# Patient Record
Sex: FEMALE | Race: WHITE | Employment: FULL TIME | URBAN - METROPOLITAN AREA
[De-identification: names, ages, dates, MRNs, and addresses within clinical notes are randomized per-mention and may not be internally consistent; named-entity substitution may affect disease eponyms.]

---

## 2021-02-19 ENCOUNTER — HOSPITAL ENCOUNTER (OUTPATIENT)
Dept: PREADMISSION TESTING | Age: 41
Discharge: HOME | End: 2021-02-19
Payer: COMMERCIAL

## 2021-02-19 LAB
ALBUMIN SERPL-MCNC: 4 G/DL (ref 3.4–5)
ALBUMIN/GLOB SERPL: 1.2 {RATIO} (ref 0.8–1.7)
ALP SERPL-CCNC: 136 U/L (ref 45–117)
ALT SERPL-CCNC: 69 U/L (ref 13–56)
ANION GAP SERPL CALC-SCNC: 7 MMOL/L (ref 3–18)
APPEARANCE UR: CLEAR
AST SERPL-CCNC: 23 U/L (ref 10–38)
BILIRUB SERPL-MCNC: 0.2 MG/DL (ref 0.2–1)
BILIRUB UR QL: NEGATIVE
BUN SERPL-MCNC: 7 MG/DL (ref 7–18)
BUN/CREAT SERPL: 8 (ref 12–20)
CALCIUM SERPL-MCNC: 8.7 MG/DL (ref 8.5–10.1)
CHLORIDE SERPL-SCNC: 102 MMOL/L (ref 100–111)
CO2 SERPL-SCNC: 29 MMOL/L (ref 21–32)
COLOR UR: YELLOW
CREAT SERPL-MCNC: 0.89 MG/DL (ref 0.6–1.3)
ERYTHROCYTE [DISTWIDTH] IN BLOOD BY AUTOMATED COUNT: 12.7 % (ref 11.6–14.5)
GLOBULIN SER CALC-MCNC: 3.3 G/DL (ref 2–4)
GLUCOSE SERPL-MCNC: 74 MG/DL (ref 74–99)
GLUCOSE UR STRIP.AUTO-MCNC: NEGATIVE MG/DL
HCT VFR BLD AUTO: 39.3 % (ref 35–45)
HGB BLD-MCNC: 13.3 G/DL (ref 12–16)
HGB UR QL STRIP: NEGATIVE
KETONES UR QL STRIP.AUTO: NEGATIVE MG/DL
LEUKOCYTE ESTERASE UR QL STRIP.AUTO: NEGATIVE
MCH RBC QN AUTO: 30.4 PG (ref 24–34)
MCHC RBC AUTO-ENTMCNC: 33.8 G/DL (ref 31–37)
MCV RBC AUTO: 89.7 FL (ref 74–97)
NITRITE UR QL STRIP.AUTO: NEGATIVE
PH UR STRIP: 6 [PH] (ref 5–8)
PLATELET # BLD AUTO: 304 K/UL (ref 135–420)
PMV BLD AUTO: 9.8 FL (ref 9.2–11.8)
POTASSIUM SERPL-SCNC: 3.7 MMOL/L (ref 3.5–5.5)
PROT SERPL-MCNC: 7.3 G/DL (ref 6.4–8.2)
PROT UR STRIP-MCNC: NEGATIVE MG/DL
RBC # BLD AUTO: 4.38 M/UL (ref 4.2–5.3)
SODIUM SERPL-SCNC: 138 MMOL/L (ref 136–145)
SP GR UR REFRACTOMETRY: 1.01 (ref 1–1.03)
UROBILINOGEN UR QL STRIP.AUTO: 0.2 EU/DL (ref 0.2–1)
WBC # BLD AUTO: 11.3 K/UL (ref 4.6–13.2)

## 2021-02-19 PROCEDURE — 81003 URINALYSIS AUTO W/O SCOPE: CPT

## 2021-02-19 PROCEDURE — 85027 COMPLETE CBC AUTOMATED: CPT

## 2021-02-19 PROCEDURE — 80053 COMPREHEN METABOLIC PANEL: CPT

## 2021-02-19 PROCEDURE — 36415 COLL VENOUS BLD VENIPUNCTURE: CPT

## 2021-03-08 ENCOUNTER — HOSPITAL ENCOUNTER (OUTPATIENT)
Dept: PREADMISSION TESTING | Age: 41
Discharge: HOME | End: 2021-03-08
Payer: COMMERCIAL

## 2021-03-08 PROCEDURE — U0003 INFECTIOUS AGENT DETECTION BY NUCLEIC ACID (DNA OR RNA); SEVERE ACUTE RESPIRATORY SYNDROME CORONAVIRUS 2 (SARS-COV-2) (CORONAVIRUS DISEASE [COVID-19]), AMPLIFIED PROBE TECHNIQUE, MAKING USE OF HIGH THROUGHPUT TECHNOLOGIES AS DESCRIBED BY CMS-2020-01-R: HCPCS

## 2021-03-09 LAB — SARS-COV-2, COV2NT: NOT DETECTED

## 2021-03-10 NOTE — H&P
Patient Name:  Sandra Aldana   Account #:  [de-identified]  YOB: 1980      Chief Complaint:  Bilateral upper extremity complaints. History of Chief Complaint:  Ms. Marie Chavez comes in today for evaluation of upper extremity complaints. EMG noted bilateral carpal tunnel symptoms and mild ulnar symptoms on the right. MRI noted possible radicular pathology. Today she comes in with continued symptoms in the upper extremities as noted previously. Past Medical/Surgical History:    Disease/Disorder Date Side Surgery Date Side Comment   Anxiety         Depression         High cholesterol         Sleep apnea            LASIK        Allergies:    Ingredient Reaction Medication Name Comment   PENICILLINS          Current Medications:    Medication Directions   dextroamphetamine-amphetamine 20 mg tablet    fluoxetine 40 mg capsule    lamotrigine 100 mg tablet    Mobic 15 mg tablet take 1 tablet by oral route  every day   montelukast 10 mg tablet    nortriptyline 25 mg capsule    omeprazole 20 mg capsule,delayed release    venlafaxine  mg capsule,extended release 24 hr    venlafaxine ER 75 mg capsule,extended release 24 hr    Women's Daily Formula      Social History:    SMOKING  Status Tobacco Type Units Per Day Yrs Used   Unknown if ever smoked        ALCOHOL  There is a history of alcohol use, but no current usage. Type: Beer and liquor. 6 pk of beer consumed weekly. Family History:    Disease Detail Family Member Age Cause of Death Comments   Arthritis Mother  N      Review of Systems:    GENERAL:  Patient has no signs of weight change. , fever or chills  HEAD/ENTM:  Patient has no signs of headaches, dizziness, hearing loss, ringing in ears, sore throat/hoarseness, recent cold, double vision, blurred vision, itchy eyes, eye redness or eye discharge. CARDIOVASCULAR:  Patient has no signs of chest pain, palpitations, rheumatic fever or heart murmur.   RESPIRATORY:  Patient has no signs of chronic cough, wheezing, difficulty breathing, pain on breathing or shortness of breath. GASTROINTESTINAL:  Patient has no signs of nausea/vomiting, difficulty swallowing, gas/bloating, indigestion, abdominal pain, diarrhea, bloody stools or hemorrhoids. GENITOURINARY:  Patient has no signs of blood in urine, painful urinating, burning sensation, bladder/kidney infection, frequent urinating or incontinence. MUSCULOSKELETAL:  Patient has no signs of swelling in feet. , fracture/dislocation, sprain/strain, tendonitis, joint stiffness, joint pain, rheumatoid disease or gout  INTEGUMENTARY:  Patient has no signs of rash/itching, psoriasis, Raynaud's phenomenon or varicose veins. EMOTIONAL:  Patient has no signs of anxiety, depression, bipolar disorder, memory loss or change in mood. Vitals:  Date BP Pulse Temp (F) Resp. (per min.) Height (Total in.) Weight (lbs.) BMI   01/13/2021     70.00  34.44     Physical Examination:    Psychiatric/General:  No acute distress; pleasant and accommodating. HEENT:  Moist mucous membranes intact. Lymphatic:  Neck is supple with no lymphadenopathy upon evaluation. Respiratory:   Equal bilateral chest wall movement with inspiration; no wheezes or stridor audible. Cardiovascular:    Regular rate and rhythm, as noted by upper extremity pulses. Musculoskeletal: On evaluation of the upper extremity she has radiating symptoms in the median and ulnar distribution on the right, mostly median symptoms on the left. She has increased symptoms about the proximal portion of the shoulder, right more so than left, in the upper arm. MRI notes level C2-3 degenerative changes and mild disc bulge as well as C6-7 level changes which may be more consistent with her current complaints, especially on the right side.       Assessment: Upper extremity complaints with bilateral carpal tunnel syndrome, possible double crush with possible ulnar nerve symptoms on the right. Recommendation:  Her symptoms seem to be more significant on the left. Will plan left open carpal tunnel release. Recommendations given for cervical epidural with additional recommendations to follow. The patient was counseled at length about the risks of lionel Covid-19 during their perioperative period and any recovery window from their procedure. The patient was made aware that lionel Covid-19  may worsen their prognosis for recovering from their procedure and lend to a higher morbidity and/or mortality risk. All material risks, benefits, and reasonable alternatives including postponing the procedure were discussed. The patient does wish to proceed with the procedure at this time.     Patti Vernon,

## 2021-03-11 ENCOUNTER — ANESTHESIA EVENT (OUTPATIENT)
Dept: SURGERY | Age: 41
End: 2021-03-11
Payer: COMMERCIAL

## 2021-03-11 NOTE — ANESTHESIA PREPROCEDURE EVALUATION
Relevant Problems   No relevant active problems       Anesthetic History   No history of anesthetic complications            Review of Systems / Medical History  Patient summary reviewed, nursing notes reviewed and pertinent labs reviewed    Pulmonary        Sleep apnea        Comments: mouthpiece   Neuro/Psych         Psychiatric history     Cardiovascular                  Exercise tolerance: >4 METS     GI/Hepatic/Renal     GERD           Endo/Other  Within defined limits           Other Findings              Physical Exam    Airway  Mallampati: II  TM Distance: 4 - 6 cm  Neck ROM: normal range of motion   Mouth opening: Normal     Cardiovascular    Rhythm: regular  Rate: normal         Dental  No notable dental hx       Pulmonary                 Abdominal  GI exam deferred       Other Findings            Anesthetic Plan    ASA: 3  Anesthesia type: general          Induction: Intravenous  Anesthetic plan and risks discussed with: Patient

## 2021-03-12 ENCOUNTER — HOSPITAL ENCOUNTER (OUTPATIENT)
Age: 41
Setting detail: OUTPATIENT SURGERY
Discharge: HOME | End: 2021-03-12
Attending: ORTHOPAEDIC SURGERY | Admitting: ORTHOPAEDIC SURGERY
Payer: COMMERCIAL

## 2021-03-12 ENCOUNTER — ANESTHESIA (OUTPATIENT)
Dept: SURGERY | Age: 41
End: 2021-03-12
Payer: COMMERCIAL

## 2021-03-12 VITALS
RESPIRATION RATE: 16 BRPM | HEIGHT: 69 IN | OXYGEN SATURATION: 98 % | WEIGHT: 255.56 LBS | DIASTOLIC BLOOD PRESSURE: 72 MMHG | BODY MASS INDEX: 37.85 KG/M2 | TEMPERATURE: 97.3 F | SYSTOLIC BLOOD PRESSURE: 121 MMHG | HEART RATE: 95 BPM

## 2021-03-12 DIAGNOSIS — Z98.890 S/P CARPAL TUNNEL RELEASE: Primary | ICD-10-CM

## 2021-03-12 LAB — HCG UR QL: NEGATIVE

## 2021-03-12 PROCEDURE — 76010000154 HC OR TIME FIRST 0.5 HR: Performed by: ORTHOPAEDIC SURGERY

## 2021-03-12 PROCEDURE — 74011000250 HC RX REV CODE- 250: Performed by: ANESTHESIOLOGY

## 2021-03-12 PROCEDURE — 74011250636 HC RX REV CODE- 250/636: Performed by: ORTHOPAEDIC SURGERY

## 2021-03-12 PROCEDURE — 77030012508 HC MSK AIRWY LMA AMBU -A: Performed by: ANESTHESIOLOGY

## 2021-03-12 PROCEDURE — 76210000063 HC OR PH I REC FIRST 0.5 HR: Performed by: ORTHOPAEDIC SURGERY

## 2021-03-12 PROCEDURE — 74011000272 HC RX REV CODE- 272: Performed by: ORTHOPAEDIC SURGERY

## 2021-03-12 PROCEDURE — 77030020782 HC GWN BAIR PAWS FLX 3M -B: Performed by: ORTHOPAEDIC SURGERY

## 2021-03-12 PROCEDURE — 76210000020 HC REC RM PH II FIRST 0.5 HR: Performed by: ORTHOPAEDIC SURGERY

## 2021-03-12 PROCEDURE — 81025 URINE PREGNANCY TEST: CPT

## 2021-03-12 PROCEDURE — 77030040361 HC SLV COMPR DVT MDII -B: Performed by: ORTHOPAEDIC SURGERY

## 2021-03-12 PROCEDURE — 74011000250 HC RX REV CODE- 250: Performed by: ORTHOPAEDIC SURGERY

## 2021-03-12 PROCEDURE — 77030002916 HC SUT ETHLN J&J -A: Performed by: ORTHOPAEDIC SURGERY

## 2021-03-12 PROCEDURE — 2709999900 HC NON-CHARGEABLE SUPPLY: Performed by: ORTHOPAEDIC SURGERY

## 2021-03-12 PROCEDURE — 76060000031 HC ANESTHESIA FIRST 0.5 HR: Performed by: ORTHOPAEDIC SURGERY

## 2021-03-12 PROCEDURE — 74011250636 HC RX REV CODE- 250/636: Performed by: PHYSICIAN ASSISTANT

## 2021-03-12 PROCEDURE — 74011250636 HC RX REV CODE- 250/636: Performed by: ANESTHESIOLOGY

## 2021-03-12 RX ORDER — HYDROCODONE BITARTRATE AND ACETAMINOPHEN 5; 325 MG/1; MG/1
1 TABLET ORAL
Qty: 21 TAB | Refills: 0 | Status: SHIPPED | OUTPATIENT
Start: 2021-03-12 | End: 2021-03-19

## 2021-03-12 RX ORDER — MIDAZOLAM HYDROCHLORIDE 1 MG/ML
INJECTION, SOLUTION INTRAMUSCULAR; INTRAVENOUS AS NEEDED
Status: DISCONTINUED | OUTPATIENT
Start: 2021-03-12 | End: 2021-03-12 | Stop reason: HOSPADM

## 2021-03-12 RX ORDER — SODIUM CHLORIDE 0.9 % (FLUSH) 0.9 %
5-40 SYRINGE (ML) INJECTION EVERY 8 HOURS
Status: DISCONTINUED | OUTPATIENT
Start: 2021-03-12 | End: 2021-03-12 | Stop reason: HOSPADM

## 2021-03-12 RX ORDER — SODIUM CHLORIDE 0.9 % (FLUSH) 0.9 %
5-40 SYRINGE (ML) INJECTION AS NEEDED
Status: DISCONTINUED | OUTPATIENT
Start: 2021-03-12 | End: 2021-03-12 | Stop reason: HOSPADM

## 2021-03-12 RX ORDER — NALOXONE HYDROCHLORIDE 0.4 MG/ML
0.1 INJECTION, SOLUTION INTRAMUSCULAR; INTRAVENOUS; SUBCUTANEOUS AS NEEDED
Status: DISCONTINUED | OUTPATIENT
Start: 2021-03-12 | End: 2021-03-12 | Stop reason: HOSPADM

## 2021-03-12 RX ORDER — PROPOFOL 10 MG/ML
INJECTION, EMULSION INTRAVENOUS AS NEEDED
Status: DISCONTINUED | OUTPATIENT
Start: 2021-03-12 | End: 2021-03-12 | Stop reason: HOSPADM

## 2021-03-12 RX ORDER — SODIUM CHLORIDE, SODIUM LACTATE, POTASSIUM CHLORIDE, CALCIUM CHLORIDE 600; 310; 30; 20 MG/100ML; MG/100ML; MG/100ML; MG/100ML
1000 INJECTION, SOLUTION INTRAVENOUS CONTINUOUS
Status: DISCONTINUED | OUTPATIENT
Start: 2021-03-12 | End: 2021-03-12 | Stop reason: HOSPADM

## 2021-03-12 RX ORDER — BUPIVACAINE HYDROCHLORIDE 2.5 MG/ML
INJECTION, SOLUTION EPIDURAL; INFILTRATION; INTRACAUDAL AS NEEDED
Status: DISCONTINUED | OUTPATIENT
Start: 2021-03-12 | End: 2021-03-12 | Stop reason: HOSPADM

## 2021-03-12 RX ORDER — ONDANSETRON 2 MG/ML
INJECTION INTRAMUSCULAR; INTRAVENOUS AS NEEDED
Status: DISCONTINUED | OUTPATIENT
Start: 2021-03-12 | End: 2021-03-12 | Stop reason: HOSPADM

## 2021-03-12 RX ORDER — FLUMAZENIL 0.1 MG/ML
0.2 INJECTION INTRAVENOUS
Status: DISCONTINUED | OUTPATIENT
Start: 2021-03-12 | End: 2021-03-12 | Stop reason: HOSPADM

## 2021-03-12 RX ORDER — CEFAZOLIN SODIUM/WATER 2 G/20 ML
2 SYRINGE (ML) INTRAVENOUS ONCE
Status: COMPLETED | OUTPATIENT
Start: 2021-03-12 | End: 2021-03-12

## 2021-03-12 RX ORDER — FENTANYL CITRATE 50 UG/ML
INJECTION, SOLUTION INTRAMUSCULAR; INTRAVENOUS AS NEEDED
Status: DISCONTINUED | OUTPATIENT
Start: 2021-03-12 | End: 2021-03-12 | Stop reason: HOSPADM

## 2021-03-12 RX ORDER — FENTANYL CITRATE 50 UG/ML
25 INJECTION, SOLUTION INTRAMUSCULAR; INTRAVENOUS AS NEEDED
Status: DISCONTINUED | OUTPATIENT
Start: 2021-03-12 | End: 2021-03-12 | Stop reason: HOSPADM

## 2021-03-12 RX ORDER — SODIUM CHLORIDE, SODIUM LACTATE, POTASSIUM CHLORIDE, CALCIUM CHLORIDE 600; 310; 30; 20 MG/100ML; MG/100ML; MG/100ML; MG/100ML
125 INJECTION, SOLUTION INTRAVENOUS CONTINUOUS
Status: DISCONTINUED | OUTPATIENT
Start: 2021-03-12 | End: 2021-03-12 | Stop reason: HOSPADM

## 2021-03-12 RX ORDER — DEXAMETHASONE SODIUM PHOSPHATE 4 MG/ML
INJECTION, SOLUTION INTRA-ARTICULAR; INTRALESIONAL; INTRAMUSCULAR; INTRAVENOUS; SOFT TISSUE AS NEEDED
Status: DISCONTINUED | OUTPATIENT
Start: 2021-03-12 | End: 2021-03-12 | Stop reason: HOSPADM

## 2021-03-12 RX ORDER — GLYCOPYRROLATE 0.2 MG/ML
INJECTION INTRAMUSCULAR; INTRAVENOUS AS NEEDED
Status: DISCONTINUED | OUTPATIENT
Start: 2021-03-12 | End: 2021-03-12 | Stop reason: HOSPADM

## 2021-03-12 RX ORDER — MAGNESIUM SULFATE 100 %
4 CRYSTALS MISCELLANEOUS AS NEEDED
Status: DISCONTINUED | OUTPATIENT
Start: 2021-03-12 | End: 2021-03-12 | Stop reason: HOSPADM

## 2021-03-12 RX ORDER — KETOROLAC TROMETHAMINE 15 MG/ML
INJECTION, SOLUTION INTRAMUSCULAR; INTRAVENOUS AS NEEDED
Status: DISCONTINUED | OUTPATIENT
Start: 2021-03-12 | End: 2021-03-12 | Stop reason: HOSPADM

## 2021-03-12 RX ORDER — LIDOCAINE HYDROCHLORIDE 20 MG/ML
INJECTION, SOLUTION EPIDURAL; INFILTRATION; INTRACAUDAL; PERINEURAL AS NEEDED
Status: DISCONTINUED | OUTPATIENT
Start: 2021-03-12 | End: 2021-03-12 | Stop reason: HOSPADM

## 2021-03-12 RX ORDER — HYDROMORPHONE HYDROCHLORIDE 1 MG/ML
0.5 INJECTION, SOLUTION INTRAMUSCULAR; INTRAVENOUS; SUBCUTANEOUS
Status: DISCONTINUED | OUTPATIENT
Start: 2021-03-12 | End: 2021-03-12 | Stop reason: HOSPADM

## 2021-03-12 RX ADMIN — ONDANSETRON HYDROCHLORIDE 4 MG: 2 INJECTION INTRAMUSCULAR; INTRAVENOUS at 07:44

## 2021-03-12 RX ADMIN — CEFAZOLIN 2 G: 1 INJECTION, POWDER, FOR SOLUTION INTRAVENOUS at 07:33

## 2021-03-12 RX ADMIN — FENTANYL CITRATE 25 MCG: 50 INJECTION, SOLUTION INTRAMUSCULAR; INTRAVENOUS at 07:38

## 2021-03-12 RX ADMIN — PROPOFOL 20 MG: 10 INJECTION, EMULSION INTRAVENOUS at 07:48

## 2021-03-12 RX ADMIN — KETOROLAC TROMETHAMINE 30 MG: 15 INJECTION, SOLUTION INTRAMUSCULAR; INTRAVENOUS at 07:56

## 2021-03-12 RX ADMIN — MIDAZOLAM 2 MG: 1 INJECTION INTRAMUSCULAR; INTRAVENOUS at 07:30

## 2021-03-12 RX ADMIN — DEXAMETHASONE SODIUM PHOSPHATE 4 MG: 4 INJECTION, SOLUTION INTRAMUSCULAR; INTRAVENOUS at 07:44

## 2021-03-12 RX ADMIN — FENTANYL CITRATE 25 MCG: 50 INJECTION, SOLUTION INTRAMUSCULAR; INTRAVENOUS at 07:45

## 2021-03-12 RX ADMIN — SODIUM CHLORIDE, SODIUM LACTATE, POTASSIUM CHLORIDE, AND CALCIUM CHLORIDE 125 ML/HR: 600; 310; 30; 20 INJECTION, SOLUTION INTRAVENOUS at 06:32

## 2021-03-12 RX ADMIN — LIDOCAINE HYDROCHLORIDE 80 MG: 20 INJECTION, SOLUTION EPIDURAL; INFILTRATION; INTRACAUDAL; PERINEURAL at 07:38

## 2021-03-12 RX ADMIN — FENTANYL CITRATE 50 MCG: 50 INJECTION, SOLUTION INTRAMUSCULAR; INTRAVENOUS at 07:48

## 2021-03-12 RX ADMIN — PROPOFOL 200 MG: 10 INJECTION, EMULSION INTRAVENOUS at 07:38

## 2021-03-12 RX ADMIN — GLYCOPYRROLATE 0.2 MG: 0.2 INJECTION INTRAMUSCULAR; INTRAVENOUS at 07:30

## 2021-03-12 NOTE — BRIEF OP NOTE
Brief Postoperative Note    Patient: David Krishnamurthy  YOB: 1980  MRN: [id number]    Date of Procedure: 3/12/2021     Pre-Op Diagnosis: CARPAL TUNNEL SYNDROME,LEFT    Post-Op Diagnosis: Same as preoperative diagnosis.       Procedure(s):  LEFT OPEN CARPAL TUNNEL RELEASE    Surgeon(s):  Brigido Felipe DO    Surgical Assistant: Physician Assistant: Jl Simmons PA-C  Surg Asst-1: Sarahi WATSON    Anesthesia: General     Estimated Blood Loss (mL): Minimal    Complications: None    Specimens: * No specimens in log *     Implants: * No implants in log *    Drains: * No LDAs found *    Findings: thickened transverse ligament     Electronically Signed by Demetrio Carreon DO on 3/12/2021 at 8:03 AM

## 2021-03-12 NOTE — INTERVAL H&P NOTE
Update History & Physical 
 
The Patient's History and Physical  was reviewed with the patient and I examined the patient. There was no change Fernando Ra The surgical site was confirmed by the patient and me. Plan:  The risk, benefits, expected outcome, and alternative to the recommended procedure have been discussed with the patient. Patient understands and wants to proceed with the procedure.  
 
Electronically signed by Estela Burgess DO on 3/12/2021 at 7:29 AM 
 no

## 2021-03-12 NOTE — ANESTHESIA POSTPROCEDURE EVALUATION
Procedure(s):  LEFT OPEN CARPAL TUNNEL RELEASE.    general    Anesthesia Post Evaluation        Comments: Post-Anesthesia Evaluation and Assessment    Cardiovascular Function/Vital Signs  /74   Pulse 86   Temp 36.3 °C (97.3 °F)   Resp 13   Ht 5' 9\" (1.753 m)   Wt 115.9 kg (255 lb 9 oz)   SpO2 95%   BMI 37.74 kg/m²     Patient is status post Procedure(s):  LEFT OPEN CARPAL TUNNEL RELEASE. Nausea/Vomiting: Controlled. Postoperative hydration reviewed and adequate. Pain:  Pain Scale 1: Numeric (0 - 10) (03/12/21 0824)  Pain Intensity 1: 0 (03/12/21 0824)   Managed. Neurological Status:   Neuro (WDL): Exceptions to WDL (03/12/21 0825)   At baseline. Mental Status and Level of Consciousness: Arousable. Pulmonary Status:   O2 Device: Room air (03/12/21 0819)   Adequate oxygenation and airway patent. Complications related to anesthesia: None    Post-anesthesia assessment completed. No concerns. Patient has met all discharge requirements. Signed By: Thalia Diez MD    March 12, 2021                   INITIAL Post-op Vital signs:   Vitals Value Taken Time   /74 03/12/21 0821   Temp 36.3 °C (97.3 °F) 03/12/21 0825   Pulse 86 03/12/21 0824   Resp 12 03/12/21 0824   SpO2 95 % 03/12/21 0824   Vitals shown include unvalidated device data.

## 2021-03-12 NOTE — PERIOP NOTES
Reviewed discharge plan of care with patient in person and with her SO on the phone. Written instructions provided as well.  They verbalized unders

## 2021-03-12 NOTE — DISCHARGE INSTRUCTIONS
[facility]  Dr. Melvina Chris Post-Operative Instructions Carpal Tunnel Release    Diet:  1. Begin with liquids and light foods such as Jell-O and soups. 2. Advance as tolerated to your regular diet if not nauseated. First 24 hours:  1. Be in the care of a responsible adult. 2. Do not drive or operate machinery. 3. Do not drink alcoholic beverages. Activities:  1. Elevate the operative hand and ice for the next 48 hours; 20 minutes on / 20 minutes off  2.. Return to work depends on your type of employment. 3.  Follow-up with Dr. Zeferino Combs in 7-10 days post-operatively. Wound Care:  1. Maintain your postoperative dressing. Loosen the ACE wrap if swelling of the fingers occurs. Medications:  1. Strong oral narcotic pain medications have been prescribed for the first few days. Use only as directed. No pain medication is capable of taking away all the pain. Taking your pills at regular intervals will give you the best chance of having less pain. 2. If you need a refill PLEASE PLAN AHEAD. Call our office during regular hours (8-5). 3. Do not combine with alcoholic beverages. 4. Be careful as you walk, climb stairs or drive as mild dizziness is not unusual.  5. Do not take medications that have not been prescribed by your surgeon. 6. You may switch to over the counter pain medication of your choice as you become more comfortable. WHEN TO CALL YOUR SURGEON:  1. Significant swelling or any new numbness in the limb that was operated on  2. Unrelenting pain  3. Fever or Chills  4. Redness around incisions  5. Color change in the fingers or hand  6. Continuous drainage or bleeding from wounds (a small amount of drainage is expected)  7. Any other worrisome condition    WHEN TO CALL YOUR REGULAR DOCTOR:  1. Flare up of any of your regular medical conditions    WHEN TO CALL 911:  1. Chest Pain  2. Shortness of Breath  3.  Any other acute serious condition    CALL THE OFFICE:   If you have severe pain unrelieved by the medications;   If you have a fever of 101.0°F or greater;    If you notice excessive swelling, redness, or persistent drainage from the incision or IV site; [facility] office number is [phone] from 8:00am to 5:00pm Monday through Friday. After 5:00pm, on weekends, or holidays, please leave a message with our answering service and the doctor on-call will get back to you shortly. Nae Jasso DISCHARGE SUMMARY from Nurse    PATIENT INSTRUCTIONS:    After general anesthesia or intravenous sedation, for 24 hours or while taking prescription Narcotics:  · Limit your activities  · Do not drive and operate hazardous machinery  · Do not make important personal or business decisions  · Do  not drink alcoholic beverages  · If you have not urinated within 8 hours after discharge, please contact your surgeon on call. Report the following to your surgeon:  · Excessive pain, swelling, redness or odor of or around the surgical area  · Temperature over 100.5  · Nausea and vomiting lasting longer than 4 hours or if unable to take medications  · Any signs of decreased circulation or nerve impairment to extremity: change in color, persistent  numbness, tingling, coldness or increase pain  · Any questions    What to do at Home:  Recommended activity: Activity as tolerated and no driving for today, no driving if taking pain meds      Please give a list of your current medications to your Primary Care Provider. *  Please update this list whenever your medications are discontinued, doses are      changed, or new medications (including over-the-counter products) are added. *  Please carry medication information at all times in case of emergency situations. These are general instructions for a healthy lifestyle:    No smoking/ No tobacco products/ Avoid exposure to second hand smoke  Surgeon General's Warning:  Quitting smoking now greatly reduces serious risk to your health.     Obesity, smoking, and sedentary lifestyle greatly increases your risk for illness    A healthy diet, regular physical exercise & weight monitoring are important for maintaining a healthy lifestyle    You may be retaining fluid if you have a history of heart failure or if you experience any of the following symptoms:  Weight gain of 3 pounds or more overnight or 5 pounds in a week, increased swelling in our hands or feet or shortness of breath while lying flat in bed. Please call your doctor as soon as you notice any of these symptoms; do not wait until your next office visit. The discharge information has been reviewed with the patient and caregiver. The patient and caregiver verbalized understanding. Discharge medications reviewed with the patient and caregiver and appropriate educational materials and side effects teaching were provided.   ___________________________________________________________________________________________________________________________________

## 2021-03-12 NOTE — PERIOP NOTES
Reviewed PTA medication list with patient/caregiver and patient/caregiver denies any additional medications. Patient admits to having a responsible adult care for them at home for at least 24 hours after surgery. Patient encouraged to use gown warming system and informed that using said warming gown to regulate body temperature prior to a procedure has been shown to help reduce the risks of blood clots and infection. Patient's pharmacy of choice verified and documented in PTA medication section. Dual skin assessment & fall risk band verification completed with [facility] RN. Urine pregnancy results negative  and verified with [facility].

## 2021-03-12 NOTE — OP NOTES
OPERATIVE NOTE    Patient: Hina Martinez MRN: [id number]  SSN: [id number]    YOB: 1980  Age: 40 y.o.  Sex: female      Indications: This is a 40 y.o. year-old female who presents with a left carpal tunnel.  The patient was admitted for surgery as conservative measures have failed.    Date of Procedure: 3/12/2021     Preoperative Diagnosis: CARPAL TUNNEL SYNDROME,LEFT    Postoperative Diagnosis: CARPAL TUNNEL SYNDROME,LEFT      Procedure: Procedure(s):  LEFT OPEN CARPAL TUNNEL RELEASE    Surgeon: Juan Francisco Terry DO    Anesthesia: General    Estimated Blood Loss: 5ml   ml    Specimens: * No specimens in log *     Drains: none    Implants: * No implants in log *    Complications: None; patient tolerated the procedure well.    Procedure: The patient was greeted by anesthesia and taken to the operative suite, where she underwent general endotracheal anesthesia.  The patient was positioned in the supine position on a standard orthopedic table.  The left arm was sterilely prepped and draped in a standard fashion.  The arm was exsanguinated with an Esmarch bandage, which was utilized as a tourniquet.  A 1-inch incision was made over the palmar flexion crease in line with the radial aspect of the fourth ray.  This was made with a 15 scalpel.  The palmar fascia was transected.  Retractors were positioned .  The central aspect of the deep transverse carpal ligament was respected utilizing a 15 blade scalpel.  A freer elevator was passed into the carpal tunnel above the tendinous and neurological structures, and the distal aspect of the deep transverse carpal ligament was subsequently completely respected with a scalpel to the palmar arch.  The freer elevator was passed in a proximal direction, and the proximal aspect of the deep transverse carpal ligament was completely resected.  At this point, evaluation of the carpal tunnel showed no evidence of abnormalities otherwise.  The tissues were irrigated  with 300 ml of sterile saline with bacitracin utilizing a Asepto syringe. The incision was subsequently reapproximated with 3-0 nylon sutures in horizontal mattress fashion x4. A soft sterile dressing was subsequently placed. The patient recovered from anesthesia and was transferred to the post anesthesia care unit in stable condition, where she was found to be neurovascularly intact.

## 2021-05-12 ENCOUNTER — TRANSCRIBE ORDER (OUTPATIENT)
Dept: SCHEDULING | Age: 41
End: 2021-05-12

## 2021-05-12 DIAGNOSIS — M50.223 OTHER CERVICAL DISC DISPLACEMENT AT C6-C7 LEVEL: Primary | ICD-10-CM

## 2021-05-12 DIAGNOSIS — M48.02 CERVICAL SPINAL STENOSIS: ICD-10-CM

## 2021-06-04 ENCOUNTER — HOSPITAL ENCOUNTER (OUTPATIENT)
Dept: GENERAL RADIOLOGY | Age: 41
Discharge: HOME | End: 2021-06-04
Attending: RADIOLOGY | Admitting: RADIOLOGY
Payer: COMMERCIAL

## 2021-06-04 ENCOUNTER — HOSPITAL ENCOUNTER (OUTPATIENT)
Dept: CT IMAGING | Age: 41
Discharge: HOME | End: 2021-06-04
Attending: ORTHOPAEDIC SURGERY
Payer: COMMERCIAL

## 2021-06-04 VITALS
TEMPERATURE: 98 F | DIASTOLIC BLOOD PRESSURE: 76 MMHG | WEIGHT: 254.5 LBS | RESPIRATION RATE: 20 BRPM | OXYGEN SATURATION: 98 % | HEART RATE: 88 BPM | HEIGHT: 69 IN | SYSTOLIC BLOOD PRESSURE: 152 MMHG | BODY MASS INDEX: 37.69 KG/M2

## 2021-06-04 DIAGNOSIS — M50.223 OTHER CERVICAL DISC DISPLACEMENT AT C6-C7 LEVEL: ICD-10-CM

## 2021-06-04 DIAGNOSIS — M48.02 CERVICAL SPINAL STENOSIS: ICD-10-CM

## 2021-06-04 LAB — HCG UR QL: NEGATIVE

## 2021-06-04 PROCEDURE — 62302 MYELOGRAPHY LUMBAR INJECTION: CPT

## 2021-06-04 PROCEDURE — 74011250637 HC RX REV CODE- 250/637: Performed by: RADIOLOGY

## 2021-06-04 PROCEDURE — 81025 URINE PREGNANCY TEST: CPT

## 2021-06-04 PROCEDURE — 74011000636 HC RX REV CODE- 636: Performed by: RADIOLOGY

## 2021-06-04 PROCEDURE — 72126 CT NECK SPINE W/DYE: CPT

## 2021-06-04 RX ORDER — LIDOCAINE HYDROCHLORIDE 10 MG/ML
1-10 INJECTION, SOLUTION EPIDURAL; INFILTRATION; INTRACAUDAL; PERINEURAL
Status: COMPLETED | OUTPATIENT
Start: 2021-06-04 | End: 2021-06-04

## 2021-06-04 RX ORDER — DIAZEPAM 5 MG/1
10 TABLET ORAL
Status: COMPLETED | OUTPATIENT
Start: 2021-06-04 | End: 2021-06-04

## 2021-06-04 RX ORDER — ACETAMINOPHEN 325 MG/1
650 TABLET ORAL
Status: DISCONTINUED | OUTPATIENT
Start: 2021-06-04 | End: 2021-06-04 | Stop reason: HOSPADM

## 2021-06-04 RX ORDER — ACETAMINOPHEN 500 MG
1000 TABLET ORAL ONCE
Status: COMPLETED | OUTPATIENT
Start: 2021-06-04 | End: 2021-06-04

## 2021-06-04 RX ORDER — HYDROCODONE BITARTRATE AND ACETAMINOPHEN 5; 325 MG/1; MG/1
1-2 TABLET ORAL
Status: DISCONTINUED | OUTPATIENT
Start: 2021-06-04 | End: 2021-06-04 | Stop reason: HOSPADM

## 2021-06-04 RX ADMIN — DIAZEPAM 10 MG: 5 TABLET ORAL at 10:24

## 2021-06-04 RX ADMIN — IOPAMIDOL 15 ML: 612 INJECTION, SOLUTION INTRATHECAL at 11:08

## 2021-06-04 RX ADMIN — ACETAMINOPHEN 500 MG: 500 TABLET ORAL at 11:19

## 2021-06-04 RX ADMIN — LIDOCAINE HYDROCHLORIDE 5 ML: 10 INJECTION, SOLUTION EPIDURAL; INFILTRATION; INTRACAUDAL; PERINEURAL at 11:08

## 2021-06-04 NOTE — DISCHARGE INSTRUCTIONS
Myelogram  Patient Education        Myelogram: About This Test  What is it? A myelogram uses X-rays and a special dye to make pictures of bones and nerves of the spine (spinal canal). The spinal canal holds the spinal cord, the spinal nerve roots, and the fluid-filled space between the bones in your spine. Why is this test done? A myelogram is done to check for:  · The cause of arm or leg numbness, weakness, or pain. · Narrowing of the spinal canal (spinal stenosis). · A tumor or infection causing problems with the spinal cord or nerve roots. · A spinal disc that has ruptured (herniated disc). · Inflammation of the membrane that covers the brain and spinal cord. · Problems with the blood vessels to the spine. This test may help find the cause of pain that can't be found by other tests, such as an MRI or a CT scan. How do you prepare for the test?  Your doctor will tell you if you need to change how much you eat and drink before the myelogram. You may be asked to increase the amount of water you drink before the test. Follow the instructions your doctor gives you about eating and drinking. If you take aspirin or some other blood thinner, ask your doctor if you should stop taking it before your test. Make sure that you understand exactly what your doctor wants you to do. These medicines increase the risk of bleeding. Be sure you have someone to take you home. Anesthesia and pain medicine will make it unsafe for you to drive or get home on your own. How is the test done? The dye is put into your spinal canal with a thin needle. This is called a lumbar puncture. The dye moves through the space so the nerve roots and spinal cord can be seen more clearly. After the dye is put in, you will lie still while the X-ray pictures are taken. How does it feel? You will feel a quick sting from a small needle that has medicine to numb the skin on your back.  You will also feel some pressure as the long, thin spinal needle is put into your spinal canal. You may feel a quick, sharp pain down your buttock or leg when the needle is moved in your spine. You may find it hard to lie on your stomach or side during this test.  The dye may make you feel warm and flushed and have a metallic taste in your mouth. Some people feel sick to their stomach or have a headache. Tell your doctor how you are feeling. How long does the test take? · A myelogram usually takes 30 minutes to 1 hour. What happens after the test?  · You will probably be able to go home 30 minutes to 2 hours after the test.  · You may need to lie in bed with your head raised for 4 to 24 hours after the test. To prevent seizures, which are a rare side effect, do not bend over or lie down with your head lower than your body. Keeping your head higher than your body after a myelogram also may help prevent or reduce other side effects, such as headache, nausea, or vomiting. · Avoid strenuous activity, such as running or heavy lifting, for at least 1 day after your myelogram.  · Drink plenty of water after the myelogram. Your doctor will give you instructions on taking your regular medicines. When should you call for help? Call 911 anytime you think you may need emergency care. For example, call if:  · You have a seizure. Call your doctor now or seek immediate medical care if:  · You have any increase in pain, weakness, or numbness in your legs. · You have a severe headache or stiff neck, or your eyes become very sensitive to light. · You have a headache that lasts longer than 24 hours. · You have problems urinating or having a bowel movement. · You have a fever. Follow-up care is a key part of your treatment and safety. Be sure to make and go to all appointments, and call your doctor if you are having problems. It's also a good idea to keep a list of the medicines you take. Ask your doctor when you can expect to have your test results. Where can you learn more?   Go to http://www.gray.com/  Enter J189 in the search box to learn more about \"Myelogram: About This Test.\"  Current as of: September 23, 2020               Content Version: 12.8  © 2006-2021 [employer]. Care instructions adapted under license by [employer] (which disclaims liability or warranty for this information). If you have questions about a medical condition or this instruction, always ask your healthcare professional. [place] [address] any warranty or liability for your use of this information. DISCHARGE SUMMARY from Nurse    PATIENT INSTRUCTIONS:    After general anesthesia or intravenous sedation, for 24 hours or while taking prescription Narcotics:  · Limit your activities  · Do not drive and operate hazardous machinery  · Do not make important personal or business decisions  · Do  not drink alcoholic beverages  · If you have not urinated within 8 hours after discharge, please contact your surgeon on call. Report the following to your surgeon:  · Excessive pain, swelling, redness or odor of or around the surgical area  · Temperature over 100.5  · Nausea and vomiting lasting longer than 4 hours or if unable to take medications  · Any signs of decreased circulation or nerve impairment to extremity: change in color, persistent  numbness, tingling, coldness or increase pain  · Any questions    What to do at Home:  Recommended activity: No lifting, Driving, or Strenuous exercise for 48 hours        *  Please give a list of your current medications to your Primary Care Provider. *  Please update this list whenever your medications are discontinued, doses are      changed, or new medications (including over-the-counter products) are added. *  Please carry medication information at all times in case of emergency situations.     These are general instructions for a healthy lifestyle:    No smoking/ No tobacco products/ Avoid exposure to second hand smoke  Surgeon General's Warning:  Quitting smoking now greatly reduces serious risk to your health. Obesity, smoking, and sedentary lifestyle greatly increases your risk for illness    A healthy diet, regular physical exercise & weight monitoring are important for maintaining a healthy lifestyle    You may be retaining fluid if you have a history of heart failure or if you experience any of the following symptoms:  Weight gain of 3 pounds or more overnight or 5 pounds in a week, increased swelling in our hands or feet or shortness of breath while lying flat in bed. Please call your doctor as soon as you notice any of these symptoms; do not wait until your next office visit. The discharge information has been reviewed with the patient and caregiver. The patient and caregiver verbalized understanding. Discharge medications reviewed with the patient and caregiver and appropriate educational materials and side effects teaching were provided.         Patient armband removed and shredded    ___________________________________________________________________________________________________________________________________

## 2021-06-04 NOTE — PROGRESS NOTES
Pt is all prepped and ready for procedure. 1110 Pt back to care unit. Myelogram tolerated poorly. Pt in pain and tears. Band-aid to lower back dry and intact. Dr Padron Service about c/o pain. Order for ES tylenol. 1119 ES tylenol 1000 mg administered for c/o headache 8/10. Encourage to rest with HOB lowered. 1200 HERBERT resolved, tolerated po well. 1400 Discharge instructions reviewed with pt and she verbalized all understandings. 0 Pt escorted to car and left with mother in stable condition. ,

## 2021-06-04 NOTE — PROCEDURES
Interventional Radiology Brief Procedure Note    Performed By: Katie Crooks PA-C    Attending Radiologist: Yancy Cabello MD    Pre-operative Diagnosis:  Bilateral upper extremity pain and paraesthesias, L >R    Post-operative Diagnosis: Same as pre-op diagnosis    Procedure(s) Performed:  Cervical Myelogram    Anesthesia:  Local Anesthesia    Findings:  After obtaining written informed consent, the patient was pre-medicated with Valium 10mg PO. The patient's low back was prepped in the usual sterile manner. 1% Lidocaine was used for local anesthesia. A 22 G spinal needle was advanced into the spinal subarachnoid space via a sublaminar oblique approach at L3. There was immediate return of clear CSF. 10 cc of Isovue 300M was instilled into dural sac. Images and subsequent CT scan to follow. Complications: None immediate    Estimated Blood Loss:  Minimal    Tubes and Drains: None    Specimens: None    Condition: Good    Disposition:  Observation x 4 hours, then D/C home.     Katie Crooks PA-C  [facility]  Vascular & Interventional Radiology  6/4/2021

## 2021-07-07 ENCOUNTER — HOSPITAL ENCOUNTER (OUTPATIENT)
Dept: PREADMISSION TESTING | Age: 41
Discharge: HOME | End: 2021-07-07
Payer: COMMERCIAL

## 2021-07-07 ENCOUNTER — TRANSCRIBE ORDER (OUTPATIENT)
Dept: REGISTRATION | Age: 41
End: 2021-07-07

## 2021-07-07 DIAGNOSIS — G56.01 CARPAL TUNNEL SYNDROME ON RIGHT: Primary | ICD-10-CM

## 2021-07-07 LAB
ALBUMIN SERPL-MCNC: 4.1 G/DL (ref 3.4–5)
ALBUMIN/GLOB SERPL: 1.2 {RATIO} (ref 0.8–1.7)
ALP SERPL-CCNC: 137 U/L (ref 45–117)
ALT SERPL-CCNC: 49 U/L (ref 13–56)
ANION GAP SERPL CALC-SCNC: 5 MMOL/L (ref 3–18)
APPEARANCE UR: CLEAR
AST SERPL-CCNC: 23 U/L (ref 10–38)
BILIRUB SERPL-MCNC: 0.4 MG/DL (ref 0.2–1)
BILIRUB UR QL: NEGATIVE
BUN SERPL-MCNC: 11 MG/DL (ref 7–18)
BUN/CREAT SERPL: 13 (ref 12–20)
CALCIUM SERPL-MCNC: 9 MG/DL (ref 8.5–10.1)
CHLORIDE SERPL-SCNC: 102 MMOL/L (ref 100–111)
CO2 SERPL-SCNC: 28 MMOL/L (ref 21–32)
COLOR UR: YELLOW
CREAT SERPL-MCNC: 0.82 MG/DL (ref 0.6–1.3)
ERYTHROCYTE [DISTWIDTH] IN BLOOD BY AUTOMATED COUNT: 12.4 % (ref 11.6–14.5)
GLOBULIN SER CALC-MCNC: 3.4 G/DL (ref 2–4)
GLUCOSE SERPL-MCNC: 106 MG/DL (ref 74–99)
GLUCOSE UR STRIP.AUTO-MCNC: NEGATIVE MG/DL
HCT VFR BLD AUTO: 38.2 % (ref 35–45)
HGB BLD-MCNC: 12.9 G/DL (ref 12–16)
HGB UR QL STRIP: NEGATIVE
KETONES UR QL STRIP.AUTO: NEGATIVE MG/DL
LEUKOCYTE ESTERASE UR QL STRIP.AUTO: NEGATIVE
MCH RBC QN AUTO: 30 PG (ref 24–34)
MCHC RBC AUTO-ENTMCNC: 33.8 G/DL (ref 31–37)
MCV RBC AUTO: 88.8 FL (ref 74–97)
NITRITE UR QL STRIP.AUTO: NEGATIVE
PH UR STRIP: 7 [PH] (ref 5–8)
PLATELET # BLD AUTO: 290 K/UL (ref 135–420)
PMV BLD AUTO: 10.5 FL (ref 9.2–11.8)
POTASSIUM SERPL-SCNC: 3.7 MMOL/L (ref 3.5–5.5)
PROT SERPL-MCNC: 7.5 G/DL (ref 6.4–8.2)
PROT UR STRIP-MCNC: NEGATIVE MG/DL
RBC # BLD AUTO: 4.3 M/UL (ref 4.2–5.3)
SODIUM SERPL-SCNC: 135 MMOL/L (ref 136–145)
SP GR UR REFRACTOMETRY: 1.01 (ref 1–1.03)
UROBILINOGEN UR QL STRIP.AUTO: 0.2 EU/DL (ref 0.2–1)
WBC # BLD AUTO: 8.6 K/UL (ref 4.6–13.2)

## 2021-07-07 PROCEDURE — 81003 URINALYSIS AUTO W/O SCOPE: CPT

## 2021-07-07 PROCEDURE — 36415 COLL VENOUS BLD VENIPUNCTURE: CPT

## 2021-07-07 PROCEDURE — 85027 COMPLETE CBC AUTOMATED: CPT

## 2021-07-07 PROCEDURE — 80053 COMPREHEN METABOLIC PANEL: CPT

## 2021-07-19 ENCOUNTER — HOSPITAL ENCOUNTER (OUTPATIENT)
Dept: PREADMISSION TESTING | Age: 41
Discharge: HOME | End: 2021-07-19
Payer: COMMERCIAL

## 2021-07-19 PROBLEM — G56.01 CARPAL TUNNEL SYNDROME, RIGHT: Status: ACTIVE | Noted: 2021-07-19

## 2021-07-19 PROCEDURE — U0003 INFECTIOUS AGENT DETECTION BY NUCLEIC ACID (DNA OR RNA); SEVERE ACUTE RESPIRATORY SYNDROME CORONAVIRUS 2 (SARS-COV-2) (CORONAVIRUS DISEASE [COVID-19]), AMPLIFIED PROBE TECHNIQUE, MAKING USE OF HIGH THROUGHPUT TECHNOLOGIES AS DESCRIBED BY CMS-2020-01-R: HCPCS

## 2021-07-19 NOTE — H&P
History and Physical        Patient: Silas Marte               Sex: female          DOA: (Not on file)         YOB: 1980      Age:  45 y.o.        LOS:  LOS: 0 days        HPI:     Silas Marte is a 45 y.o. female who complains of right upper extremity numbness and tingling consistent with carpal tunnel syndrome. EMG of upper extremity reveals moderately severe carpal tunnel syndrome. The patient denies any other manifestations. At this time the patient's pain effects their activities of daily living and would like to move forward with surgical intervention. Past Medical History:   Diagnosis Date    Chronic pain     GERD (gastroesophageal reflux disease)     Psychiatric disorder     anxiety    Sleep apnea     mouth piece       Past Surgical History:   Procedure Laterality Date    HX HEENT      wisdom teeth    HX ORTHOPAEDIC      carpel tunnel to lt hand       No family history on file. Social History     Socioeconomic History    Marital status: SINGLE     Spouse name: Not on file    Number of children: Not on file    Years of education: Not on file    Highest education level: Not on file   Tobacco Use    Smoking status: Former Smoker    Smokeless tobacco: Never Used   Substance and Sexual Activity    Alcohol use: Not Currently    Drug use: Never     Social Determinants of Health     Financial Resource Strain:     Difficulty of Paying Living Expenses:    Food Insecurity:     Worried About Running Out of Food in the Last Year:     [address] in the Last Year:    Transportation Needs:     Lack of Transportation (Medical):      Lack of Transportation (Non-Medical):    Physical Activity:     Days of Exercise per Week:     Minutes of Exercise per Session:    Stress:     Feeling of Stress :    Social Connections:     Frequency of Communication with Friends and Family:     Frequency of Social Gatherings with Friends and Family:     Attends Spiritism Services:     Active Member of Clubs or Organizations:     Attends Club or Organization Meetings:     Marital Status:        Prior to Admission medications    Medication Sig Start Date End Date Taking? Authorizing Provider   gabapentin (NEURONTIN) 300 mg capsule Take 600 mg by mouth nightly. Provider, Historical   FLUoxetine (PROzac) 40 mg capsule Take  by mouth daily. Provider, Historical   Venlafaxine-ER 24 HR (EFFEXOR-ER) 225 mg tablet Take 225 mg by mouth daily. Provider, Historical   meloxicam (MOBIC) 15 mg tablet Take 15 mg by mouth daily. Provider, Historical   dextroamphetamine-amphetamine (AdderalL) 20 mg tablet Take 20 mg by mouth two (2) times a day. Provider, Historical   montelukast (Singulair) 10 mg tablet Take 10 mg by mouth every evening. Provider, Historical   multivitamin (ONE A DAY) tablet Take 1 Tab by mouth daily. Provider, Historical   Omeprazole delayed release (PRILOSEC D/R) 20 mg tablet Take 20 mg by mouth daily. Provider, Historical       Allergies   Allergen Reactions    Penicillins Rash       Review of Systems  Pertinent items are noted in the History of Present Illness. Physical Exam:      There were no vitals taken for this visit. Physical Exam:  General: Alert and Oriented X 3  Lungs: Clear to ausculation bilaterally  Cardiovascular: Regular Rate and Rhythm, without murmur  Abdomen: Soft, nontender with positive bowel sounds in all four quadrants  Gential/Rectal: deferred  Musculoskeletal: The patient appears healthy and comfortable. Arterial pulses are normal.  The skin is normal appearing with no contusions or wounds noted. There is no tenderness at the distal radius and ulna. There is no swelling at the wrist.  All wrist joints have full range of motion. There is no tenderness to palpation over the anatomical snuffbox. Positive Tinnels with numbness in the median distribution. Labs Reviewed:     All lab results for the last 24 hours reviewed. Assessment/Plan     Principal Problem:    Carpal tunnel syndrome, right (7/19/2021)        We are going to move forward with a right open carpal tunnel release. The risks vs the benefits have been discussed with the patient. They will follow-up in the office 10 days post-operatively. They will call with any and all concerns.

## 2021-07-20 LAB — SARS-COV-2, COV2NT: NOT DETECTED

## 2021-07-22 ENCOUNTER — ANESTHESIA EVENT (OUTPATIENT)
Dept: SURGERY | Age: 41
End: 2021-07-22
Payer: COMMERCIAL

## 2021-07-23 ENCOUNTER — ANESTHESIA (OUTPATIENT)
Dept: SURGERY | Age: 41
End: 2021-07-23
Payer: COMMERCIAL

## 2021-07-23 ENCOUNTER — HOSPITAL ENCOUNTER (OUTPATIENT)
Age: 41
Setting detail: OUTPATIENT SURGERY
Discharge: HOME | End: 2021-07-23
Attending: ORTHOPAEDIC SURGERY | Admitting: ORTHOPAEDIC SURGERY
Payer: COMMERCIAL

## 2021-07-23 VITALS
SYSTOLIC BLOOD PRESSURE: 123 MMHG | OXYGEN SATURATION: 99 % | BODY MASS INDEX: 37.61 KG/M2 | RESPIRATION RATE: 16 BRPM | TEMPERATURE: 97.1 F | WEIGHT: 253.9 LBS | HEART RATE: 100 BPM | HEIGHT: 69 IN | DIASTOLIC BLOOD PRESSURE: 73 MMHG

## 2021-07-23 DIAGNOSIS — Z98.890 S/P CARPAL TUNNEL RELEASE: Primary | ICD-10-CM

## 2021-07-23 LAB — HCG UR QL: NEGATIVE

## 2021-07-23 PROCEDURE — 74011000250 HC RX REV CODE- 250: Performed by: REGISTERED NURSE

## 2021-07-23 PROCEDURE — 74011000250 HC RX REV CODE- 250: Performed by: ORTHOPAEDIC SURGERY

## 2021-07-23 PROCEDURE — 76210000063 HC OR PH I REC FIRST 0.5 HR: Performed by: ORTHOPAEDIC SURGERY

## 2021-07-23 PROCEDURE — 76210000021 HC REC RM PH II 0.5 TO 1 HR: Performed by: ORTHOPAEDIC SURGERY

## 2021-07-23 PROCEDURE — 77030013079 HC BLNKT BAIR HGGR 3M -A: Performed by: ANESTHESIOLOGY

## 2021-07-23 PROCEDURE — 77030020782 HC GWN BAIR PAWS FLX 3M -B: Performed by: ORTHOPAEDIC SURGERY

## 2021-07-23 PROCEDURE — 2709999900 HC NON-CHARGEABLE SUPPLY: Performed by: ORTHOPAEDIC SURGERY

## 2021-07-23 PROCEDURE — 74011250636 HC RX REV CODE- 250/636: Performed by: ORTHOPAEDIC SURGERY

## 2021-07-23 PROCEDURE — 74011250636 HC RX REV CODE- 250/636: Performed by: PHYSICIAN ASSISTANT

## 2021-07-23 PROCEDURE — 77030040361 HC SLV COMPR DVT MDII -B: Performed by: ORTHOPAEDIC SURGERY

## 2021-07-23 PROCEDURE — 74011250636 HC RX REV CODE- 250/636: Performed by: REGISTERED NURSE

## 2021-07-23 PROCEDURE — 77030002916 HC SUT ETHLN J&J -A: Performed by: ORTHOPAEDIC SURGERY

## 2021-07-23 PROCEDURE — 77030012508 HC MSK AIRWY LMA AMBU -A: Performed by: ANESTHESIOLOGY

## 2021-07-23 PROCEDURE — 76060000031 HC ANESTHESIA FIRST 0.5 HR: Performed by: ORTHOPAEDIC SURGERY

## 2021-07-23 PROCEDURE — 76010000154 HC OR TIME FIRST 0.5 HR: Performed by: ORTHOPAEDIC SURGERY

## 2021-07-23 PROCEDURE — 81025 URINE PREGNANCY TEST: CPT

## 2021-07-23 RX ORDER — SODIUM CHLORIDE 0.9 % (FLUSH) 0.9 %
5-40 SYRINGE (ML) INJECTION EVERY 8 HOURS
Status: DISCONTINUED | OUTPATIENT
Start: 2021-07-23 | End: 2021-07-23 | Stop reason: HOSPADM

## 2021-07-23 RX ORDER — DEXAMETHASONE SODIUM PHOSPHATE 4 MG/ML
INJECTION, SOLUTION INTRA-ARTICULAR; INTRALESIONAL; INTRAMUSCULAR; INTRAVENOUS; SOFT TISSUE AS NEEDED
Status: DISCONTINUED | OUTPATIENT
Start: 2021-07-23 | End: 2021-07-23 | Stop reason: HOSPADM

## 2021-07-23 RX ORDER — SODIUM CHLORIDE, SODIUM LACTATE, POTASSIUM CHLORIDE, CALCIUM CHLORIDE 600; 310; 30; 20 MG/100ML; MG/100ML; MG/100ML; MG/100ML
100 INJECTION, SOLUTION INTRAVENOUS CONTINUOUS
Status: DISCONTINUED | OUTPATIENT
Start: 2021-07-23 | End: 2021-07-23 | Stop reason: HOSPADM

## 2021-07-23 RX ORDER — SODIUM CHLORIDE, SODIUM LACTATE, POTASSIUM CHLORIDE, CALCIUM CHLORIDE 600; 310; 30; 20 MG/100ML; MG/100ML; MG/100ML; MG/100ML
125 INJECTION, SOLUTION INTRAVENOUS CONTINUOUS
Status: DISCONTINUED | OUTPATIENT
Start: 2021-07-23 | End: 2021-07-23 | Stop reason: HOSPADM

## 2021-07-23 RX ORDER — SODIUM CHLORIDE 0.9 % (FLUSH) 0.9 %
5-40 SYRINGE (ML) INJECTION AS NEEDED
Status: DISCONTINUED | OUTPATIENT
Start: 2021-07-23 | End: 2021-07-23 | Stop reason: HOSPADM

## 2021-07-23 RX ORDER — CEFAZOLIN SODIUM/WATER 2 G/20 ML
2 SYRINGE (ML) INTRAVENOUS ONCE
Status: COMPLETED | OUTPATIENT
Start: 2021-07-23 | End: 2021-07-23

## 2021-07-23 RX ORDER — PROPOFOL 10 MG/ML
INJECTION, EMULSION INTRAVENOUS AS NEEDED
Status: DISCONTINUED | OUTPATIENT
Start: 2021-07-23 | End: 2021-07-23 | Stop reason: HOSPADM

## 2021-07-23 RX ORDER — MIDAZOLAM HYDROCHLORIDE 1 MG/ML
INJECTION, SOLUTION INTRAMUSCULAR; INTRAVENOUS AS NEEDED
Status: DISCONTINUED | OUTPATIENT
Start: 2021-07-23 | End: 2021-07-23 | Stop reason: HOSPADM

## 2021-07-23 RX ORDER — KETOROLAC TROMETHAMINE 15 MG/ML
INJECTION, SOLUTION INTRAMUSCULAR; INTRAVENOUS AS NEEDED
Status: DISCONTINUED | OUTPATIENT
Start: 2021-07-23 | End: 2021-07-23 | Stop reason: HOSPADM

## 2021-07-23 RX ORDER — ONDANSETRON 2 MG/ML
INJECTION INTRAMUSCULAR; INTRAVENOUS AS NEEDED
Status: DISCONTINUED | OUTPATIENT
Start: 2021-07-23 | End: 2021-07-23 | Stop reason: HOSPADM

## 2021-07-23 RX ORDER — OXYCODONE HYDROCHLORIDE 5 MG/1
5 TABLET ORAL
Status: DISCONTINUED | OUTPATIENT
Start: 2021-07-23 | End: 2021-07-23 | Stop reason: HOSPADM

## 2021-07-23 RX ORDER — PROCHLORPERAZINE EDISYLATE 5 MG/ML
5 INJECTION INTRAMUSCULAR; INTRAVENOUS ONCE
Status: DISCONTINUED | OUTPATIENT
Start: 2021-07-23 | End: 2021-07-23 | Stop reason: HOSPADM

## 2021-07-23 RX ORDER — LIDOCAINE HYDROCHLORIDE 20 MG/ML
INJECTION, SOLUTION EPIDURAL; INFILTRATION; INTRACAUDAL; PERINEURAL AS NEEDED
Status: DISCONTINUED | OUTPATIENT
Start: 2021-07-23 | End: 2021-07-23 | Stop reason: HOSPADM

## 2021-07-23 RX ORDER — FLUMAZENIL 0.1 MG/ML
0.2 INJECTION INTRAVENOUS
Status: DISCONTINUED | OUTPATIENT
Start: 2021-07-23 | End: 2021-07-23 | Stop reason: HOSPADM

## 2021-07-23 RX ORDER — HYDROCODONE BITARTRATE AND ACETAMINOPHEN 5; 325 MG/1; MG/1
1 TABLET ORAL
Qty: 21 TABLET | Refills: 0 | Status: SHIPPED | OUTPATIENT
Start: 2021-07-23 | End: 2021-07-30

## 2021-07-23 RX ORDER — NALOXONE HYDROCHLORIDE 0.4 MG/ML
0.1 INJECTION, SOLUTION INTRAMUSCULAR; INTRAVENOUS; SUBCUTANEOUS AS NEEDED
Status: DISCONTINUED | OUTPATIENT
Start: 2021-07-23 | End: 2021-07-23 | Stop reason: HOSPADM

## 2021-07-23 RX ORDER — FENTANYL CITRATE 50 UG/ML
25 INJECTION, SOLUTION INTRAMUSCULAR; INTRAVENOUS AS NEEDED
Status: DISCONTINUED | OUTPATIENT
Start: 2021-07-23 | End: 2021-07-23 | Stop reason: HOSPADM

## 2021-07-23 RX ORDER — GLYCOPYRROLATE 0.2 MG/ML
INJECTION INTRAMUSCULAR; INTRAVENOUS AS NEEDED
Status: DISCONTINUED | OUTPATIENT
Start: 2021-07-23 | End: 2021-07-23 | Stop reason: HOSPADM

## 2021-07-23 RX ORDER — DIPHENHYDRAMINE HYDROCHLORIDE 50 MG/ML
12.5 INJECTION, SOLUTION INTRAMUSCULAR; INTRAVENOUS
Status: DISCONTINUED | OUTPATIENT
Start: 2021-07-23 | End: 2021-07-23 | Stop reason: HOSPADM

## 2021-07-23 RX ORDER — BUPIVACAINE HYDROCHLORIDE 2.5 MG/ML
INJECTION, SOLUTION EPIDURAL; INFILTRATION; INTRACAUDAL AS NEEDED
Status: DISCONTINUED | OUTPATIENT
Start: 2021-07-23 | End: 2021-07-23 | Stop reason: HOSPADM

## 2021-07-23 RX ADMIN — GLYCOPYRROLATE 0.2 MG: 0.2 INJECTION INTRAMUSCULAR; INTRAVENOUS at 08:53

## 2021-07-23 RX ADMIN — CEFAZOLIN 2 G: 1 INJECTION, POWDER, FOR SOLUTION INTRAVENOUS at 08:46

## 2021-07-23 RX ADMIN — LIDOCAINE HYDROCHLORIDE 100 MG: 20 INJECTION, SOLUTION INTRAVENOUS at 08:40

## 2021-07-23 RX ADMIN — KETOROLAC TROMETHAMINE 30 MG: 15 INJECTION, SOLUTION INTRAMUSCULAR; INTRAVENOUS at 08:59

## 2021-07-23 RX ADMIN — ONDANSETRON HYDROCHLORIDE 4 MG: 2 INJECTION INTRAMUSCULAR; INTRAVENOUS at 08:53

## 2021-07-23 RX ADMIN — MIDAZOLAM 2 MG: 1 INJECTION INTRAMUSCULAR; INTRAVENOUS at 08:36

## 2021-07-23 RX ADMIN — DEXAMETHASONE SODIUM PHOSPHATE 4 MG: 4 INJECTION, SOLUTION INTRAMUSCULAR; INTRAVENOUS at 08:53

## 2021-07-23 RX ADMIN — SODIUM CHLORIDE, SODIUM LACTATE, POTASSIUM CHLORIDE, AND CALCIUM CHLORIDE 125 ML/HR: 600; 310; 30; 20 INJECTION, SOLUTION INTRAVENOUS at 07:10

## 2021-07-23 RX ADMIN — PROPOFOL 200 MG: 10 INJECTION, EMULSION INTRAVENOUS at 08:40

## 2021-07-23 NOTE — OP NOTES
OPERATIVE NOTE    Patient: Jalyn Saldivar MRN: [id number]  SSN: [id number]    YOB: 1980  Age: 45 y.o. Sex: female      Indications: This is a 45y.o. year-old female who presents with a right carpal tunnel. The patient was admitted for surgery as conservative measures have failed. Date of Procedure: 7/23/2021     Preoperative Diagnosis: CARPAL TUNNEL SYNDROME - RIGHT    Postoperative Diagnosis: CARPAL TUNNEL SYNDROME - RIGHT      Procedure: Procedure(s):  RIGHT OPEN CARPAL TUNNEL RELEASE    Surgeon: Patria Trejo DO    Anesthesia: General    Estimated Blood Loss: 5ml  ivf 800 ml    Specimens: * No specimens in log *     Drains: none    Implants: * No implants in log *    Complications: None; patient tolerated the procedure well. Procedure: The patient was greeted by anesthesia and taken to the operative suite, where she underwent general endotracheal anesthesia. The patient was positioned in the supine position on a standard orthopedic table. The right arm was sterilely prepped and draped in a standard fashion. The arm was exsanguinated with an Esmarch bandage, which was utilized as a tourniquet. A 1-inch incision was made over the palmar flexion crease in line with the radial aspect of the fourth ray. This was made with a 15 scalpel. The palmar fascia was transected. Retractors were positioned . The central aspect of the deep transverse carpal ligament was respected utilizing a 15 blade scalpel. A freer elevator was passed into the carpal tunnel above the tendinous and neurological structures, and the distal aspect of the deep transverse carpal ligament was subsequently completely respected with a scalpel to the palmar arch. The freer elevator was passed in a proximal direction, and the proximal aspect of the deep transverse carpal ligament was completely resected. At this point, evaluation of the carpal tunnel showed no evidence of abnormalities otherwise.   The tissues were irrigated with 300 ml of sterile saline with bacitracin utilizing a Asepto syringe. The incision was subsequently reapproximated with 3-0 nylon sutures in horizontal mattress fashion x4. A soft sterile dressing was subsequently placed. The patient recovered from anesthesia and was transferred to the post anesthesia care unit in stable condition, where she was found to be neurovascularly intact.

## 2021-07-23 NOTE — ANESTHESIA PREPROCEDURE EVALUATION
Relevant Problems   No relevant active problems       Anesthetic History   No history of anesthetic complications            Review of Systems / Medical History  Patient summary reviewed, nursing notes reviewed and pertinent labs reviewed    Pulmonary      Recent URI  Sleep apnea: CPAP        Comments: Recent bronchitis diagnosis. On antibiotics, lungs clear.  No fevers   Neuro/Psych         Psychiatric history     Cardiovascular  Within defined limits                     GI/Hepatic/Renal     GERD: well controlled           Endo/Other        Obesity     Other Findings            Physical Exam    Airway  Mallampati: III  TM Distance: 4 - 6 cm  Neck ROM: normal range of motion   Mouth opening: Normal     Cardiovascular    Rhythm: regular  Rate: normal         Dental  No notable dental hx       Pulmonary  Breath sounds clear to auscultation               Abdominal  GI exam deferred       Other Findings            Anesthetic Plan    ASA: 2  Anesthesia type: general          Induction: Intravenous  Anesthetic plan and risks discussed with: Patient

## 2021-07-23 NOTE — INTERVAL H&P NOTE
Update History & Physical    The Patient's History and Physical was reviewed with the patient and I examined the patient. There was no change. The surgical site was confirmed by the patient and me. Plan:  The risk, benefits, expected outcome, and alternative to the recommended procedure have been discussed with the patient. Patient understands and wants to proceed with the procedure.     Electronically signed by Minal Lu DO on 7/23/2021 at 7:37 AM

## 2021-07-23 NOTE — PERIOP NOTES
Report to [facility]TH[facility], RN ready for phase 2 level of care. Requested an out note from anesthesia.

## 2021-07-23 NOTE — PERIOP NOTES
Reviewed PTA medication list with patient/caregiver and patient/caregiver denies any additional medications. Patient admits to having a responsible adult care for them at home for at least 24 hours after surgery. Patient encouraged to use gown warming system and informed that using said warming gown to regulate body temperature prior to a procedure has been shown to help reduce the risks of blood clots and infection. Patient's pharmacy of choice verified and documented in PTA medication section. Dual skin assessment & fall risk band verification completed with Girish Duran RN.

## 2021-07-23 NOTE — PERIOP NOTES
TRANSFER - IN REPORT:    Verbal report received from ORN & CRNA on Hilton Knows  being received from OR(unit) for routine post - op      Report consisted of patients Situation, Background, Assessment and   Recommendations(SBAR). Information from the following report(s) SBAR was reviewed with the receiving nurse. Opportunity for questions and clarification was provided. Assessment completed upon patients arrival to unit and care assumed.

## 2021-07-23 NOTE — DISCHARGE INSTRUCTIONS
[facility] Post-Operative Instructions Carpal Tunnel Release    Diet:  1. Begin with liquids and light foods such as Jell-O and soups. 2. Advance as tolerated to your regular diet if not nauseated. First 24 hours:  1. Be in the care of a responsible adult. 2. Do not drive or operate machinery. 3. Do not drink alcoholic beverages. Activities:  1. Elevate the operative hand and ice for the next 48 hours; 20 minutes on / 20 minutes off  2.. Return to work depends on your type of employment. 3.  Follow-up with Dr. Seema Fitzgerald in 7-10 days post-operatively. Wound Care:  1. Maintain your postoperative dressing. Loosen the ACE wrap if swelling of the fingers occurs. Medications:  1. Strong oral narcotic pain medications have been prescribed for the first few days. Use only as directed. No pain medication is capable of taking away all the pain. Taking your pills at regular intervals will give you the best chance of having less pain. 2. If you need a refill PLEASE PLAN AHEAD. Call our office during regular hours (8-5). 3. Do not combine with alcoholic beverages. 4. Be careful as you walk, climb stairs or drive as mild dizziness is not unusual.  5. Do not take medications that have not been prescribed by your surgeon. 6. You may switch to over the counter pain medication of your choice as you become more comfortable. WHEN TO CALL YOUR SURGEON:  1. Significant swelling or any new numbness in the limb that was operated on  2. Unrelenting pain  3. Fever or Chills  4. Redness around incisions  5. Color change in the fingers or hand  6. Continuous drainage or bleeding from wounds (a small amount of drainage is expected)  7. Any other worrisome condition    WHEN TO CALL YOUR REGULAR DOCTOR:  1. Flare up of any of your regular medical conditions    WHEN TO CALL 911:  1. Chest Pain  2. Shortness of Breath  3.  Any other acute serious condition    CALL THE OFFICE:   If you have severe pain unrelieved by the medications;   If you have a fever of 101.0°F or greater;    If you notice excessive swelling, redness, or persistent drainage from the incision or IV site; [facility] office number is [phone] from 8:00am to 5:00pm Monday through Friday. After 5:00pm, on weekends, or holidays, please leave a message with our answering service and the doctor on-call will get back to you shortly. DISCHARGE SUMMARY from Nurse    PATIENT INSTRUCTIONS:    After general anesthesia or intravenous sedation, for 24 hours or while taking prescription Narcotics:  · Limit your activities  · Do not drive and operate hazardous machinery  · Do not make important personal or business decisions  · Do  not drink alcoholic beverages  · If you have not urinated within 8 hours after discharge, please contact your surgeon on call. Report the following to your surgeon:  · Excessive pain, swelling, redness or odor of or around the surgical area  · Temperature over 100.5  · Nausea and vomiting lasting longer than 4 hours or if unable to take medications  · Any signs of decreased circulation or nerve impairment to extremity: change in color, persistent  numbness, tingling, coldness or increase pain  · Any questions    What to do at Home:  [facility] [address] IN 10 DAYS CALL FOR APPT    If you experience any of the following symptoms, fever, chills, bloody drainage, increase in pain please follow up with your surgeon. *  Please give a list of your current medications to your Primary Care Provider. *  Please update this list whenever your medications are discontinued, doses are      changed, or new medications (including over-the-counter products) are added. *  Please carry medication information at all times in case of emergency situations.     These are general instructions for a healthy lifestyle:    No smoking/ No tobacco products/ Avoid exposure to second hand smoke  Surgeon General's Warning: Quitting smoking now greatly reduces serious risk to your health. Obesity, smoking, and sedentary lifestyle greatly increases your risk for illness    A healthy diet, regular physical exercise & weight monitoring are important for maintaining a healthy lifestyle    You may be retaining fluid if you have a history of heart failure or if you experience any of the following symptoms:  Weight gain of 3 pounds or more overnight or 5 pounds in a week, increased swelling in our hands or feet or shortness of breath while lying flat in bed. Please call your doctor as soon as you notice any of these symptoms; do not wait until your next office visit. Patient armband removed and shredded    The discharge information has been reviewed with the patient and caregiver. The patient and caregiver verbalized understanding. Discharge medications reviewed with the patient and caregiver and appropriate educational materials and side effects teaching were provided.   ___________________________________________________________________________________________________________________________________

## 2021-07-23 NOTE — ANESTHESIA POSTPROCEDURE EVALUATION
Procedure(s):  RIGHT OPEN CARPAL TUNNEL RELEASE.    general    Anesthesia Post Evaluation      Multimodal analgesia: multimodal analgesia used between 6 hours prior to anesthesia start to PACU discharge  Patient location during evaluation: PACU  Patient participation: complete - patient participated  Level of consciousness: awake and alert  Pain management: satisfactory to patient  Airway patency: patent  Anesthetic complications: no  Cardiovascular status: acceptable, hemodynamically stable and blood pressure returned to baseline  Respiratory status: acceptable and nasal cannula  Hydration status: acceptable  Post anesthesia nausea and vomiting:  none  Final Post Anesthesia Temperature Assessment:  Normothermia (36.0-37.5 degrees C)      INITIAL Post-op Vital signs:   Vitals Value Taken Time   /69 07/23/21 0931   Temp 36.9 °C (98.4 °F) 07/23/21 0932   Pulse 96 07/23/21 0933   Resp 14 07/23/21 0933   SpO2 96 % 07/23/21 0933   Vitals shown include unvalidated device data.

## 2021-07-23 NOTE — BRIEF OP NOTE
Brief Postoperative Note    Patient: King Aristides  YOB: 1980  MRN: [id number]    Date of Procedure: 7/23/2021     Pre-Op Diagnosis: CARPAL TUNNEL SYNDROME - RIGHT    Post-Op Diagnosis: Same as preoperative diagnosis.       Procedure(s):  RIGHT OPEN CARPAL TUNNEL RELEASE    Surgeon(s):  Jill Villarreal DO    Surgical Assistant: Physician Assistant: Danielle Hanna PA-C  Surg Asst-1: Rafat Saeed    Anesthesia: General     Estimated Blood Loss (mL): Minimal    Complications: None    Specimens: * No specimens in log *     Implants: * No implants in log *    Drains: * No LDAs found *    Findings: thickened transverse carpal ligament    Electronically Signed by Rola Cisneros DO on 7/23/2021 at 9:03 AM

## 2022-03-18 PROBLEM — G56.01 CARPAL TUNNEL SYNDROME, RIGHT: Status: ACTIVE | Noted: 2021-07-19

## 2023-12-24 ENCOUNTER — HOSPITAL ENCOUNTER (EMERGENCY)
Facility: HOSPITAL | Age: 43
Discharge: HOME | End: 2023-12-24
Attending: EMERGENCY MEDICINE
Payer: COMMERCIAL

## 2023-12-24 ENCOUNTER — APPOINTMENT (OUTPATIENT)
Facility: HOSPITAL | Age: 43
End: 2023-12-24
Payer: COMMERCIAL

## 2023-12-24 VITALS
SYSTOLIC BLOOD PRESSURE: 145 MMHG | DIASTOLIC BLOOD PRESSURE: 86 MMHG | TEMPERATURE: 97.7 F | BODY MASS INDEX: 34.5 KG/M2 | HEART RATE: 86 BPM | HEIGHT: 70 IN | RESPIRATION RATE: 18 BRPM | WEIGHT: 241 LBS | OXYGEN SATURATION: 95 %

## 2023-12-24 DIAGNOSIS — R19.7 NAUSEA VOMITING AND DIARRHEA: Primary | ICD-10-CM

## 2023-12-24 DIAGNOSIS — R11.2 NAUSEA VOMITING AND DIARRHEA: Primary | ICD-10-CM

## 2023-12-24 DIAGNOSIS — E86.0 DEHYDRATION: ICD-10-CM

## 2023-12-24 LAB
ANION GAP SERPL CALC-SCNC: 8 MMOL/L (ref 3–18)
APPEARANCE UR: ABNORMAL
BACTERIA URNS QL MICRO: ABNORMAL /HPF
BASOPHILS # BLD: 0 K/UL (ref 0–0.1)
BASOPHILS NFR BLD: 0 % (ref 0–2)
BILIRUB UR QL: ABNORMAL
BUN SERPL-MCNC: 7 MG/DL (ref 7–18)
BUN/CREAT SERPL: 9 (ref 12–20)
CALCIUM SERPL-MCNC: 8.9 MG/DL (ref 8.5–10.1)
CHLORIDE SERPL-SCNC: 107 MMOL/L (ref 100–111)
CO2 SERPL-SCNC: 24 MMOL/L (ref 21–32)
COLOR UR: ABNORMAL
CREAT SERPL-MCNC: 0.75 MG/DL (ref 0.6–1.3)
DIFFERENTIAL METHOD BLD: ABNORMAL
EOSINOPHIL # BLD: 0 K/UL (ref 0–0.4)
EOSINOPHIL NFR BLD: 0 % (ref 0–5)
EPITH CASTS URNS QL MICRO: ABNORMAL /LPF (ref 0–5)
ERYTHROCYTE [DISTWIDTH] IN BLOOD BY AUTOMATED COUNT: 13.2 % (ref 11.6–14.5)
FLUAV RNA SPEC QL NAA+PROBE: NOT DETECTED
FLUBV RNA SPEC QL NAA+PROBE: NOT DETECTED
GLUCOSE SERPL-MCNC: 120 MG/DL (ref 74–99)
GLUCOSE UR STRIP.AUTO-MCNC: NEGATIVE MG/DL
HCT VFR BLD AUTO: 38.7 % (ref 35–45)
HGB BLD-MCNC: 13.6 G/DL (ref 12–16)
HGB UR QL STRIP: NEGATIVE
IMM GRANULOCYTES # BLD AUTO: 0 K/UL
IMM GRANULOCYTES NFR BLD AUTO: 0 %
KETONES UR QL STRIP.AUTO: ABNORMAL MG/DL
LEUKOCYTE ESTERASE UR QL STRIP.AUTO: NEGATIVE
LIPASE SERPL-CCNC: 24 U/L (ref 13–75)
LYMPHOCYTES # BLD: 5.1 K/UL (ref 0.9–3.6)
LYMPHOCYTES NFR BLD: 54 % (ref 21–52)
MCH RBC QN AUTO: 30.1 PG (ref 24–34)
MCHC RBC AUTO-ENTMCNC: 35.1 G/DL (ref 31–37)
MCV RBC AUTO: 85.6 FL (ref 78–100)
MONOCYTES # BLD: 0.7 K/UL (ref 0.05–1.2)
MONOCYTES NFR BLD: 7 % (ref 3–10)
NEUTS BAND NFR BLD MANUAL: 2 % (ref 0–5)
NEUTS SEG # BLD: 3.7 K/UL (ref 1.8–8)
NEUTS SEG NFR BLD: 37 % (ref 40–73)
NITRITE UR QL STRIP.AUTO: NEGATIVE
NRBC # BLD: 0 K/UL (ref 0–0.01)
NRBC BLD-RTO: 0 PER 100 WBC
PH UR STRIP: 5.5 (ref 5–8)
PLATELET # BLD AUTO: 262 K/UL (ref 135–420)
PLATELET COMMENT: ABNORMAL
PMV BLD AUTO: 10.8 FL (ref 9.2–11.8)
POTASSIUM SERPL-SCNC: 3.5 MMOL/L (ref 3.5–5.5)
PROT UR STRIP-MCNC: ABNORMAL MG/DL
RBC # BLD AUTO: 4.52 M/UL (ref 4.2–5.3)
RBC #/AREA URNS HPF: ABNORMAL /HPF (ref 0–5)
RBC MORPH BLD: ABNORMAL
SARS-COV-2 RNA RESP QL NAA+PROBE: NOT DETECTED
SODIUM SERPL-SCNC: 139 MMOL/L (ref 136–145)
SP GR UR REFRACTOMETRY: 1.03 (ref 1–1.03)
UROBILINOGEN UR QL STRIP.AUTO: 1 EU/DL (ref 0.2–1)
WBC # BLD AUTO: 9.5 K/UL (ref 4.6–13.2)
WBC URNS QL MICRO: ABNORMAL /HPF (ref 0–5)

## 2023-12-24 PROCEDURE — 96374 THER/PROPH/DIAG INJ IV PUSH: CPT

## 2023-12-24 PROCEDURE — 96361 HYDRATE IV INFUSION ADD-ON: CPT

## 2023-12-24 PROCEDURE — 71045 X-RAY EXAM CHEST 1 VIEW: CPT

## 2023-12-24 PROCEDURE — 87636 SARSCOV2 & INF A&B AMP PRB: CPT

## 2023-12-24 PROCEDURE — 85025 COMPLETE CBC W/AUTO DIFF WBC: CPT

## 2023-12-24 PROCEDURE — 93005 ELECTROCARDIOGRAM TRACING: CPT | Performed by: EMERGENCY MEDICINE

## 2023-12-24 PROCEDURE — 2580000003 HC RX 258: Performed by: EMERGENCY MEDICINE

## 2023-12-24 PROCEDURE — 80048 BASIC METABOLIC PNL TOTAL CA: CPT

## 2023-12-24 PROCEDURE — 6360000002 HC RX W HCPCS: Performed by: EMERGENCY MEDICINE

## 2023-12-24 PROCEDURE — 81001 URINALYSIS AUTO W/SCOPE: CPT

## 2023-12-24 PROCEDURE — 99285 EMERGENCY DEPT VISIT HI MDM: CPT

## 2023-12-24 PROCEDURE — 83690 ASSAY OF LIPASE: CPT

## 2023-12-24 RX ORDER — FAMOTIDINE 20 MG/1
20 TABLET, FILM COATED ORAL 2 TIMES DAILY
Qty: 60 TABLET | Refills: 3 | Status: SHIPPED | OUTPATIENT
Start: 2023-12-24

## 2023-12-24 RX ORDER — KETOROLAC TROMETHAMINE 15 MG/ML
30 INJECTION, SOLUTION INTRAMUSCULAR; INTRAVENOUS
Status: COMPLETED | OUTPATIENT
Start: 2023-12-24 | End: 2023-12-24

## 2023-12-24 RX ORDER — 0.9 % SODIUM CHLORIDE 0.9 %
1000 INTRAVENOUS SOLUTION INTRAVENOUS ONCE
Status: COMPLETED | OUTPATIENT
Start: 2023-12-24 | End: 2023-12-24

## 2023-12-24 RX ORDER — OMEGA-3 FATTY ACIDS/FISH OIL 300-1000MG
3 CAPSULE ORAL EVERY 6 HOURS PRN
Qty: 60 CAPSULE | Refills: 1 | Status: SHIPPED | OUTPATIENT
Start: 2023-12-24

## 2023-12-24 RX ORDER — PROMETHAZINE HYDROCHLORIDE 25 MG/1
25 TABLET ORAL 3 TIMES DAILY PRN
Qty: 20 TABLET | Refills: 0 | Status: SHIPPED | OUTPATIENT
Start: 2023-12-24 | End: 2023-12-31

## 2023-12-24 RX ADMIN — SODIUM CHLORIDE 1000 ML: 9 INJECTION, SOLUTION INTRAVENOUS at 12:39

## 2023-12-24 RX ADMIN — KETOROLAC TROMETHAMINE 30 MG: 15 INJECTION, SOLUTION INTRAMUSCULAR; INTRAVENOUS at 12:39

## 2023-12-24 ASSESSMENT — PAIN SCALES - GENERAL
PAINLEVEL_OUTOF10: 10
PAINLEVEL_OUTOF10: 6

## 2023-12-24 ASSESSMENT — PAIN DESCRIPTION - LOCATION: LOCATION: ABDOMEN

## 2023-12-24 ASSESSMENT — PAIN - FUNCTIONAL ASSESSMENT: PAIN_FUNCTIONAL_ASSESSMENT: 0-10

## 2023-12-24 NOTE — ED NOTES
Paperwork read with patient making note of where to  prescriptions     IV taken out   Armband removed and disposed of in shredder. Patient has no further questions at this time. Will discharge from system.

## 2023-12-24 NOTE — ED TRIAGE NOTES
Chills, sweats, body aches, vomiting, unable to keep PO down since Friday.  Pt leaned over in wheelchair in triage, only giving yes/no answers, difficult to assess

## 2023-12-24 NOTE — ED PROVIDER NOTES
EMERGENCY DEPARTMENT HISTORY AND PHYSICAL EXAM    Date: 12/24/2023  Patient Name: Latonya Villanueva    History of Presenting Illness     Chief Complaint   Patient presents with    Emesis    Generalized Body Aches    Sweats         History Provided By: Patient    Additional History (Context):   12:09 PM FRED Villanueva is a 43 y.o. female with PMHX of obesity, sleep apnea, asthma-like syndrome, reflux, anxiety, chronic pain associated with cervical spondylosis, who presents to the emergency department C/O vomiting and generalized bodyaches.  Patient presents with nausea vomiting onset of symptoms earlier today.  She has vomiting cannot keep food or drink down.  She had mild symptoms started on Friday 2 days ago and worsened over the last 2 days.  There is subjective chills although no kate fevers.  Headache associated with vomiting spells.  She complains of decreased urinary output also has some loose stool.    Social History  Quit smoking about 10 years ago.  No alcohol or drug use.    Family History  Grandparent with diabetes, lung cancer mother side of the family.  Father with prostate cancer.      PCP: Deja Otero MD    No current facility-administered medications for this encounter.     Current Outpatient Medications   Medication Sig Dispense Refill    famotidine (PEPCID) 20 MG tablet Take 1 tablet by mouth 2 times daily 60 tablet 3    ibuprofen (ADVIL LIQUI-GELS MINIS) 200 MG CAPS capsule Take 3 capsules by mouth every 6 hours as needed for Fever 60 capsule 1    promethazine (PHENERGAN) 25 MG tablet Take 1 tablet by mouth 3 times daily as needed for Nausea 20 tablet 0    amphetamine-dextroamphetamine (ADDERALL) 20 MG tablet Take 20 mg by mouth 2 times daily.      FLUoxetine (PROZAC) 40 MG capsule Take by mouth daily      gabapentin (NEURONTIN) 300 MG capsule Take 600 mg by mouth.      meloxicam (MOBIC) 15 MG tablet Take 15 mg by mouth daily      montelukast (SINGULAIR) 10 MG tablet Take 10 mg  first provider for this patient. I reviewed the vital signs, available nursing notes, past medical history, past surgical history, family history and social history. Vital Signs-Reviewed the patient's vital signs. Pulse Oximetry Analysis - ***% on ***     Cardiac Monitor:  Rate: *** bpm  Rhythm: ***    EKG interpretation: (Preliminary)  3:03 PM   ***  EKG read by Sylvia Haque MD      Records Reviewed: NURSING NOTES AND PREVIOUS MEDICAL RECORDS    Provider Notes (Medical Decision Making):   ***    Procedures:  Procedures    ED Course:   2:59 PM EST: Initial assessment performed. The patients presenting problems have been discussed, and they are in agreement with the care plan formulated and outlined with them. I have encouraged them to ask questions as they arise throughout their visit. Diagnosis and Disposition       DISCHARGE NOTE:  ***  Latonya Villanueva's  results have been reviewed with her. She has been counseled regarding her diagnosis, treatment, and plan. She verbally conveys understanding and agreement of the signs, symptoms, diagnosis, treatment and prognosis and additionally agrees to follow up as discussed. She also agrees with the care-plan and conveys that all of her questions have been answered. I have also provided discharge instructions for her that include: educational information regarding their diagnosis and treatment, and list of reasons why they would want to return to the ED prior to their follow-up appointment, should her condition change. She has been provided with education for proper emergency department utilization. CLINICAL IMPRESSION:    1. Nausea vomiting and diarrhea    2. Dehydration        PLAN:  1. D/C Home  2. Medication List        START taking these medications      famotidine 20 MG tablet  Commonly known as: Pepcid  Take 1 tablet by mouth 2 times daily     ibuprofen 200 MG Caps capsule  Commonly known as:  Advil Liqui-Gels minis  Take 3

## 2023-12-27 LAB
EKG ATRIAL RATE: 80 BPM
EKG DIAGNOSIS: NORMAL
EKG P AXIS: 31 DEGREES
EKG P-R INTERVAL: 144 MS
EKG Q-T INTERVAL: 360 MS
EKG QRS DURATION: 98 MS
EKG QTC CALCULATION (BAZETT): 415 MS
EKG R AXIS: 57 DEGREES
EKG T AXIS: 51 DEGREES
EKG VENTRICULAR RATE: 80 BPM

## 2024-11-20 ENCOUNTER — TRANSCRIBE ORDERS (OUTPATIENT)
Facility: HOSPITAL | Age: 44
End: 2024-11-20

## 2024-11-20 ENCOUNTER — HOSPITAL ENCOUNTER (OUTPATIENT)
Facility: HOSPITAL | Age: 44
Discharge: HOME | End: 2024-11-23
Payer: COMMERCIAL

## 2024-11-20 DIAGNOSIS — Z11.3 ENCOUNTER FOR SPECIAL SCREENING EXAMINATION FOR INFECTION WITH PREDOMINANTLY SEXUAL MODE OF TRANSMISSION: ICD-10-CM

## 2024-11-20 DIAGNOSIS — Z11.3 ENCOUNTER FOR SPECIAL SCREENING EXAMINATION FOR INFECTION WITH PREDOMINANTLY SEXUAL MODE OF TRANSMISSION: Primary | ICD-10-CM

## 2024-11-20 PROCEDURE — 86803 HEPATITIS C AB TEST: CPT

## 2024-11-20 PROCEDURE — 86696 HERPES SIMPLEX TYPE 2 TEST: CPT

## 2024-11-20 PROCEDURE — 87389 HIV-1 AG W/HIV-1&-2 AB AG IA: CPT

## 2024-11-20 PROCEDURE — 36415 COLL VENOUS BLD VENIPUNCTURE: CPT

## 2024-11-20 PROCEDURE — 87340 HEPATITIS B SURFACE AG IA: CPT

## 2024-11-20 PROCEDURE — 86592 SYPHILIS TEST NON-TREP QUAL: CPT

## 2024-11-21 LAB
HBV SURFACE AG SER QL: <0.1 INDEX
HBV SURFACE AG SER QL: NEGATIVE
HCV AB SERPL QL IA: NORMAL
HCV IGG SERPL QL IA: NON REACTIVE S/CO RATIO
HIV 1+2 AB+HIV1 P24 AG SERPL QL IA: NONREACTIVE
HIV 1/2 RESULT COMMENT: NORMAL
HSV2 IGG SER IA-ACNC: NON REACTIVE

## 2024-11-22 LAB — RPR SER QL: NON REACTIVE

## (undated) DEVICE — PACK PROCEDURE SURG EXTREMITY CUST

## (undated) DEVICE — BANDAGE COMPR W3INXL5YD BGE POLY COT E RECOVERABLE BRTH W/

## (undated) DEVICE — GARMENT,MEDLINE,DVT,INT,CALF,MED, GEN2: Brand: MEDLINE

## (undated) DEVICE — STERILE POLYISOPRENE POWDER-FREE SURGICAL GLOVES: Brand: PROTEXIS

## (undated) DEVICE — PREP SKN CHLRAPRP APL 26ML STR --

## (undated) DEVICE — UNDERCAST PADDING: Brand: DEROYAL

## (undated) DEVICE — DRESSING,GAUZE,XEROFORM,CURAD,1"X8",ST: Brand: CURAD

## (undated) DEVICE — SUT ETHLN 3-0 18IN PS2 BLK --

## (undated) DEVICE — GLOVE SURG SZ 85 L12IN FNGR THK79MIL GRN LTX FREE

## (undated) DEVICE — GLOVE SURG SZ 85 L12IN FNGR ORTHO 126MIL CRM LTX FREE